# Patient Record
Sex: FEMALE | Race: BLACK OR AFRICAN AMERICAN | NOT HISPANIC OR LATINO | URBAN - METROPOLITAN AREA
[De-identification: names, ages, dates, MRNs, and addresses within clinical notes are randomized per-mention and may not be internally consistent; named-entity substitution may affect disease eponyms.]

---
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
## 2020-09-09 ENCOUNTER — IMPORTED ENCOUNTER (OUTPATIENT)
Dept: URBAN - METROPOLITAN AREA CLINIC 38 | Facility: CLINIC | Age: 20
End: 2020-09-09

## 2020-09-09 PROBLEM — H52.13 MYOPIA: Noted: 2020-09-09

## 2020-09-09 PROBLEM — H52.13 MYOPIA, BILATERAL: Noted: 2020-09-09

## 2020-09-09 PROCEDURE — 92015 DETERMINE REFRACTIVE STATE: CPT

## 2022-07-02 ASSESSMENT — KERATOMETRY
OD_K1POWER_DIOPTERS: 41.25
OS_K1POWER_DIOPTERS: 41.75
OS_K2POWER_DIOPTERS: 42.75
OD_AXISANGLE_DEGREES: 175
OS_AXISANGLE_DEGREES: 174
OS_AXISANGLE_DEGREES: 169
OS_K2POWER_DIOPTERS: 42.50
OS_AXISANGLE2_DEGREES: 79
OD_K2POWER_DIOPTERS: 42.50
OS_K1POWER_DIOPTERS: 41.50
OD_AXISANGLE2_DEGREES: 82
OD_AXISANGLE_DEGREES: 172
OD_K1POWER_DIOPTERS: 41.50
OD_K2POWER_DIOPTERS: 42.25
OS_AXISANGLE2_DEGREES: 84
OD_AXISANGLE2_DEGREES: 85

## 2022-07-02 ASSESSMENT — VISUAL ACUITY
OD_CC: 20/25
OS_CC: 20/30-1
OD_CC: J1
OS_CC: J1

## 2022-07-02 ASSESSMENT — TONOMETRY
OD_IOP_MMHG: 20
OD_IOP_MMHG: 18
OS_IOP_MMHG: 18
OS_IOP_MMHG: 16

## 2022-11-07 ENCOUNTER — ESTABLISHED COMPREHENSIVE EXAM (OUTPATIENT)
Dept: URBAN - METROPOLITAN AREA CLINIC 68 | Facility: CLINIC | Age: 22
End: 2022-11-07

## 2022-11-07 DIAGNOSIS — H52.13: ICD-10-CM

## 2022-11-07 PROCEDURE — 92014 COMPRE OPH EXAM EST PT 1/>: CPT

## 2022-11-07 PROCEDURE — 92015 DETERMINE REFRACTIVE STATE: CPT

## 2022-11-07 PROCEDURE — 92310 CONTACT LENS FITTING OU: CPT

## 2022-11-07 ASSESSMENT — VISUAL ACUITY
OS_CC: J1
OS_CC: 20/20-1
OD_CC: J1
OD_CC: 20/20

## 2022-11-07 ASSESSMENT — KERATOMETRY
OD_AXISANGLE_DEGREES: 172
OS_AXISANGLE2_DEGREES: 84
OS_K2POWER_DIOPTERS: 42.75
OD_K2POWER_DIOPTERS: 42.50
OD_K1POWER_DIOPTERS: 41.50
OS_AXISANGLE_DEGREES: 174
OD_AXISANGLE2_DEGREES: 82
OS_K1POWER_DIOPTERS: 41.75

## 2022-11-07 ASSESSMENT — TONOMETRY
OD_IOP_MMHG: 17
OS_IOP_MMHG: 20

## 2024-08-18 ENCOUNTER — HOSPITAL ENCOUNTER (OUTPATIENT)
Age: 24
Discharge: HOME | End: 2024-08-18
Payer: COMMERCIAL

## 2024-08-18 VITALS
RESPIRATION RATE: 24 BRPM | SYSTOLIC BLOOD PRESSURE: 124 MMHG | TEMPERATURE: 97 F | HEART RATE: 72 BPM | DIASTOLIC BLOOD PRESSURE: 90 MMHG | OXYGEN SATURATION: 100 %

## 2024-08-18 DIAGNOSIS — R11.2 NAUSEA AND VOMITING IN ADULT: ICD-10-CM

## 2024-08-18 DIAGNOSIS — E86.0 DEHYDRATION: Primary | ICD-10-CM

## 2024-08-18 LAB
#MXD IC: 0.2 X10ˆ3/UL (ref 0.1–1)
BUN BLD-MCNC: <5 MG/DL (ref 7–18)
CHLORIDE BLD-SCNC: 107 MMOL/L (ref 98–112)
CO2 BLD-SCNC: 22 MMOL/L (ref 21–32)
CREAT BLD-MCNC: 0.6 MG/DL
EGFRCR SERPLBLD CKD-EPI 2021: 127 ML/MIN/1.73M2 (ref 60–?)
GLUCOSE BLD-MCNC: 103 MG/DL (ref 70–99)
HCT VFR BLD AUTO: 34.9 %
HCT VFR BLD CALC: 37 %
HGB BLD-MCNC: 12.3 G/DL
ISTAT IONIZED CALCIUM FOR CHEM 8: 1.07 MMOL/L (ref 1.12–1.32)
LYMPHOCYTES # BLD AUTO: 1.9 X10ˆ3/UL (ref 1–4)
LYMPHOCYTES NFR BLD AUTO: 24.8 %
MCH RBC QN AUTO: 30.2 PG (ref 26–34)
MCHC RBC AUTO-ENTMCNC: 35.2 G/DL (ref 31–37)
MCV RBC AUTO: 85.7 FL (ref 80–100)
MIXED CELL %: 3.1 %
NEUTROPHILS # BLD AUTO: 5.7 X10ˆ3/UL (ref 1.5–7.7)
NEUTROPHILS NFR BLD AUTO: 72.1 %
PLATELET # BLD AUTO: 294 X10ˆ3/UL (ref 150–450)
POTASSIUM BLD-SCNC: 3.8 MMOL/L (ref 3.6–5.1)
RBC # BLD AUTO: 4.07 X10ˆ6/UL
SODIUM BLD-SCNC: 141 MMOL/L (ref 136–145)
WBC # BLD AUTO: 7.8 X10ˆ3/UL (ref 4–11)

## 2024-08-18 PROCEDURE — 96361 HYDRATE IV INFUSION ADD-ON: CPT

## 2024-08-18 PROCEDURE — 80047 BASIC METABLC PNL IONIZED CA: CPT

## 2024-08-18 PROCEDURE — 99204 OFFICE O/P NEW MOD 45 MIN: CPT

## 2024-08-18 PROCEDURE — 85025 COMPLETE CBC W/AUTO DIFF WBC: CPT | Performed by: PHYSICIAN ASSISTANT

## 2024-08-18 PROCEDURE — 96374 THER/PROPH/DIAG INJ IV PUSH: CPT

## 2024-08-18 RX ORDER — ONDANSETRON 4 MG/1
4 TABLET, ORALLY DISINTEGRATING ORAL EVERY 4 HOURS PRN
Qty: 10 TABLET | Refills: 0 | Status: SHIPPED | OUTPATIENT
Start: 2024-08-18 | End: 2024-08-25

## 2024-08-18 RX ORDER — ONDANSETRON 2 MG/ML
4 INJECTION INTRAMUSCULAR; INTRAVENOUS ONCE
Status: COMPLETED | OUTPATIENT
Start: 2024-08-18 | End: 2024-08-18

## 2024-08-18 NOTE — ED PROVIDER NOTES
Patient Seen in: [facility]      History     Chief Complaint   Patient presents with    Vomiting     Stated Complaint: abdominal pain    Subjective:   HPI    24-year-old female presenting for evaluation of abdominal pain, nausea and vomiting.  Patient reports she got drunk yesterday evening and has had nausea and vomiting throughout the day today.  Intermittently she has tolerated some small volume of Gatorade.  She denies fever/chills.  Also endorses daily marijuana use.    Objective:   History reviewed. No pertinent past medical history.           No pertinent past surgical history.              No pertinent social history.            Review of Systems    Positive for stated Chief Complaint: Vomiting    Other systems are as noted in HPI.  Constitutional and vital signs reviewed.      All other systems reviewed and negative except as noted above.    Physical Exam     ED Triage Vitals [08/18/24 1451]   /90   Pulse 72   Resp 24   Temp 97.1 °F (36.2 °C)   Temp src Oral   SpO2 100 %   O2 Device None (Room air)       Current Vitals:   Vital Signs  BP: 124/90  Pulse: 72  Resp: 24  Temp: 97.1 °F (36.2 °C)  Temp src: Oral    Oxygen Therapy  SpO2: 100 %  O2 Device: None (Room air)            Physical Exam  Vitals and nursing note reviewed.   Constitutional:       General: She is not in acute distress.  HENT:      Head: Normocephalic and atraumatic.      Right Ear: External ear normal.      Left Ear: External ear normal.      Nose: Nose normal.      Mouth/Throat:      Mouth: Mucous membranes are moist.   Eyes:      Extraocular Movements: Extraocular movements intact.      Pupils: Pupils are equal, round, and reactive to light.   Cardiovascular:      Rate and Rhythm: Normal rate.   Pulmonary:      Effort: Pulmonary effort is normal.   Abdominal:      General: Abdomen is flat. There is no distension.      Tenderness: There is abdominal tenderness (generalized).   Musculoskeletal:         General: Normal  range of motion.      Cervical back: Normal range of motion.   Skin:     General: Skin is warm.   Neurological:      General: No focal deficit present.      Mental Status: She is alert and oriented to person, place, and time.   Psychiatric:         Mood and Affect: Mood normal.         Behavior: Behavior normal.               ED Course     Labs Reviewed   POCT CBC - Abnormal; Notable for the following components:       Result Value    HCT IC 34.9 (*)     All other components within normal limits   POCT ISTAT CHEM8 CARTRIDGE - Abnormal; Notable for the following components:    ISTAT BUN <5 (*)     ISTAT Ionized Calcium 1.07 (*)     ISTAT Glucose 103 (*)     All other components within normal limits        24-year-old female presenting for evaluation of nausea and vomiting.  Patient notes EtOH use yesterday evening, heavy consumption.  Afebrile in the immediate care.  Generalized abdominal pain with no rebound or guarding    Ddx-EtOH intoxication, dehydration, appendicitis, pancreatitis  Labs reassuring, patient has not had any vomiting since receiving the Zofran and fluids.  She notes significant improvement in the nausea and abdominal discomfort.    She is comfortable with discharged home at this time.  She is aware of ER return precautions including new or worsening abdominal pain, persistent vomiting, fevers.    Zofran rx'd               MDM                                         Medical Decision Making      Disposition and Plan     Clinical Impression:  1. Dehydration    2. Nausea and vomiting in adult         Disposition:  Discharge  8/18/2024  4:04 pm    Follow-up:  No follow-up provider specified.        Medications Prescribed:  Current Discharge Medication List        START taking these medications    Details   ondansetron 4 MG Oral Tablet Dispersible Take 1 tablet (4 mg total) by mouth every 4 (four) hours as needed for Nausea.  Qty: 10 tablet, Refills: 0

## 2024-08-18 NOTE — ED INITIAL ASSESSMENT (HPI)
Patient states she drank alcohol last night and and smoked marijuana. Patient has been vomiting this morning and states she wants to make sure her electrolytes are ok. Patient uses marijuana daily. Patient with pain to her abdomen, however states the site changes.

## 2024-10-14 ENCOUNTER — ESTABLISHED COMPREHENSIVE EXAM (OUTPATIENT)
Dept: URBAN - METROPOLITAN AREA CLINIC 68 | Facility: CLINIC | Age: 24
End: 2024-10-14

## 2024-10-14 DIAGNOSIS — H52.13: ICD-10-CM

## 2024-10-14 PROCEDURE — 92014 COMPRE OPH EXAM EST PT 1/>: CPT

## 2024-10-14 PROCEDURE — 92310 CONTACT LENS FITTING OU: CPT

## 2024-10-14 ASSESSMENT — KERATOMETRY
OS_K2POWER_DIOPTERS: 42.75
OD_K1POWER_DIOPTERS: 41.75
OD_AXISANGLE_DEGREES: 174
OS_AXISANGLE2_DEGREES: 84
OD_K2POWER_DIOPTERS: 42.50
OS_AXISANGLE_DEGREES: 174
OD_AXISANGLE2_DEGREES: 84
OS_K1POWER_DIOPTERS: 41.75

## 2024-10-14 ASSESSMENT — TONOMETRY
OS_IOP_MMHG: 17
OD_IOP_MMHG: 15

## 2024-10-14 ASSESSMENT — VISUAL ACUITY
OS_CC: 20/20
OD_CC: 20/25-1
OD_CC: J1
OS_CC: J1

## 2025-06-21 ENCOUNTER — HOSPITAL ENCOUNTER (EMERGENCY)
Age: 25
Discharge: HOME | End: 2025-06-21
Attending: STUDENT IN AN ORGANIZED HEALTH CARE EDUCATION/TRAINING PROGRAM

## 2025-06-21 VITALS
WEIGHT: 125 LBS | RESPIRATION RATE: 16 BRPM | TEMPERATURE: 98.6 F | OXYGEN SATURATION: 100 % | HEIGHT: 62 IN | BODY MASS INDEX: 23 KG/M2 | HEART RATE: 62 BPM | SYSTOLIC BLOOD PRESSURE: 113 MMHG | DIASTOLIC BLOOD PRESSURE: 76 MMHG

## 2025-06-21 DIAGNOSIS — K52.9 GASTROENTERITIS: Primary | ICD-10-CM

## 2025-06-21 LAB
ALBUMIN SERPL-MCNC: 4 G/DL (ref 3.4–5)
ALBUMIN/GLOB SERPL: 1.1 {RATIO} (ref 1–2.4)
ALP SERPL-CCNC: 59 UNITS/L (ref 45–117)
ALT SERPL-CCNC: 20 UNITS/L
ANION GAP SERPL CALC-SCNC: 9 MMOL/L (ref 7–19)
APPEARANCE UR: CLEAR
AST SERPL-CCNC: 16 UNITS/L
BACTERIA #/AREA URNS HPF: ABNORMAL /HPF
BASOPHILS # BLD: 0 K/MCL (ref 0–0.3)
BASOPHILS NFR BLD: 0 %
BILIRUB SERPL-MCNC: 1.1 MG/DL (ref 0.2–1)
BILIRUB UR QL STRIP: NEGATIVE
BUN SERPL-MCNC: 6 MG/DL (ref 6–20)
BUN/CREAT SERPL: 9 (ref 7–25)
CALCIUM SERPL-MCNC: 9 MG/DL (ref 8.4–10.2)
CHLORIDE SERPL-SCNC: 109 MMOL/L (ref 97–110)
CO2 SERPL-SCNC: 27 MMOL/L (ref 21–32)
COLOR UR: YELLOW
CREAT SERPL-MCNC: 0.65 MG/DL (ref 0.51–0.95)
DEPRECATED RDW RBC: 41.3 FL (ref 39–50)
EGFRCR SERPLBLD CKD-EPI 2021: >90 ML/MIN/{1.73_M2}
EOSINOPHIL # BLD: 0 K/MCL (ref 0–0.5)
EOSINOPHIL NFR BLD: 0 %
ERYTHROCYTE [DISTWIDTH] IN BLOOD: 12.7 % (ref 11–15)
FASTING DURATION TIME PATIENT: ABNORMAL H
GLOBULIN SER-MCNC: 3.7 G/DL (ref 2–4)
GLUCOSE SERPL-MCNC: 108 MG/DL (ref 70–99)
GLUCOSE UR STRIP-MCNC: NEGATIVE MG/DL
HCT VFR BLD CALC: 38.4 % (ref 36–46.5)
HGB BLD-MCNC: 12.7 G/DL (ref 12–15.5)
HGB UR QL STRIP: ABNORMAL
HYALINE CASTS #/AREA URNS LPF: ABNORMAL /LPF
IMM GRANULOCYTES # BLD AUTO: 0 K/MCL (ref 0–0.2)
IMM GRANULOCYTES # BLD: 0 %
KETONES UR STRIP-MCNC: ABNORMAL MG/DL
LEUKOCYTE ESTERASE UR QL STRIP: NEGATIVE
LIPASE SERPL-CCNC: 22 UNITS/L (ref 15–77)
LYMPHOCYTES # BLD: 0.4 K/MCL (ref 1–4.8)
LYMPHOCYTES NFR BLD: 5 %
MCH RBC QN AUTO: 29.3 PG (ref 26–34)
MCHC RBC AUTO-ENTMCNC: 33.1 G/DL (ref 32–36.5)
MCV RBC AUTO: 88.7 FL (ref 78–100)
MONOCYTES # BLD: 0.2 K/MCL (ref 0.3–0.9)
MONOCYTES NFR BLD: 3 %
MUCOUS THREADS URNS QL MICRO: PRESENT
NEUTROPHILS # BLD: 8.5 K/MCL (ref 1.8–7.7)
NEUTROPHILS NFR BLD: 92 %
NITRITE UR QL STRIP: NEGATIVE
NRBC BLD MANUAL-RTO: 0 /100 WBC
PH UR STRIP: 8 [PH] (ref 5–7)
PLATELET # BLD AUTO: 290 K/MCL (ref 140–450)
POTASSIUM SERPL-SCNC: 3.5 MMOL/L (ref 3.4–5.1)
PROT SERPL-MCNC: 7.7 G/DL (ref 6.4–8.2)
PROT UR STRIP-MCNC: ABNORMAL MG/DL
RAINBOW EXTRA TUBES HOLD SPECIMEN: NORMAL
RBC # BLD: 4.33 MIL/MCL (ref 4–5.2)
RBC #/AREA URNS HPF: ABNORMAL /HPF
SODIUM SERPL-SCNC: 141 MMOL/L (ref 135–145)
SP GR UR STRIP: 1.02 (ref 1–1.03)
SQUAMOUS #/AREA URNS HPF: ABNORMAL /HPF
UROBILINOGEN UR STRIP-MCNC: 0.2 MG/DL
WBC # BLD: 9.2 K/MCL (ref 4.2–11)
WBC #/AREA URNS HPF: ABNORMAL /HPF

## 2025-06-21 PROCEDURE — 10002807 HB RX 258: Performed by: STUDENT IN AN ORGANIZED HEALTH CARE EDUCATION/TRAINING PROGRAM

## 2025-06-21 PROCEDURE — 99284 EMERGENCY DEPT VISIT MOD MDM: CPT | Performed by: STUDENT IN AN ORGANIZED HEALTH CARE EDUCATION/TRAINING PROGRAM

## 2025-06-21 PROCEDURE — 96361 HYDRATE IV INFUSION ADD-ON: CPT

## 2025-06-21 PROCEDURE — 81001 URINALYSIS AUTO W/SCOPE: CPT | Performed by: STUDENT IN AN ORGANIZED HEALTH CARE EDUCATION/TRAINING PROGRAM

## 2025-06-21 PROCEDURE — 80053 COMPREHEN METABOLIC PANEL: CPT | Performed by: STUDENT IN AN ORGANIZED HEALTH CARE EDUCATION/TRAINING PROGRAM

## 2025-06-21 PROCEDURE — 83690 ASSAY OF LIPASE: CPT | Performed by: STUDENT IN AN ORGANIZED HEALTH CARE EDUCATION/TRAINING PROGRAM

## 2025-06-21 PROCEDURE — 96374 THER/PROPH/DIAG INJ IV PUSH: CPT

## 2025-06-21 PROCEDURE — 10002800 HB RX 250 W HCPCS: Performed by: STUDENT IN AN ORGANIZED HEALTH CARE EDUCATION/TRAINING PROGRAM

## 2025-06-21 PROCEDURE — 85025 COMPLETE CBC W/AUTO DIFF WBC: CPT | Performed by: STUDENT IN AN ORGANIZED HEALTH CARE EDUCATION/TRAINING PROGRAM

## 2025-06-21 RX ORDER — ONDANSETRON 2 MG/ML
4 INJECTION INTRAMUSCULAR; INTRAVENOUS ONCE
Status: COMPLETED | OUTPATIENT
Start: 2025-06-21 | End: 2025-06-21

## 2025-06-21 RX ORDER — ONDANSETRON 4 MG/1
4 TABLET, ORALLY DISINTEGRATING ORAL EVERY 6 HOURS
Qty: 10 TABLET | Refills: 0 | Status: SHIPPED | OUTPATIENT
Start: 2025-06-21

## 2025-06-21 RX ADMIN — ONDANSETRON 4 MG: 2 INJECTION INTRAMUSCULAR; INTRAVENOUS at 13:21

## 2025-06-21 RX ADMIN — SODIUM CHLORIDE, POTASSIUM CHLORIDE, SODIUM LACTATE AND CALCIUM CHLORIDE 1000 ML: 600; 310; 30; 20 INJECTION, SOLUTION INTRAVENOUS at 13:20

## 2025-06-21 ASSESSMENT — ENCOUNTER SYMPTOMS
VOMITING: 1
ABDOMINAL PAIN: 1
NAUSEA: 1
DIARRHEA: 1

## 2025-06-21 ASSESSMENT — PAIN SCALES - GENERAL: PAINLEVEL_OUTOF10: 5
